# Patient Record
Sex: MALE | Race: BLACK OR AFRICAN AMERICAN | NOT HISPANIC OR LATINO
[De-identification: names, ages, dates, MRNs, and addresses within clinical notes are randomized per-mention and may not be internally consistent; named-entity substitution may affect disease eponyms.]

---

## 2020-02-19 ENCOUNTER — APPOINTMENT (OUTPATIENT)
Dept: ORTHOPEDIC SURGERY | Facility: CLINIC | Age: 70
End: 2020-02-19
Payer: MEDICARE

## 2020-02-19 VITALS
WEIGHT: 190 LBS | DIASTOLIC BLOOD PRESSURE: 134 MMHG | HEIGHT: 70 IN | SYSTOLIC BLOOD PRESSURE: 217 MMHG | HEART RATE: 160 BPM | BODY MASS INDEX: 27.2 KG/M2

## 2020-02-19 DIAGNOSIS — T14.8XXA OTHER INJURY OF UNSPECIFIED BODY REGION, INITIAL ENCOUNTER: ICD-10-CM

## 2020-02-19 DIAGNOSIS — Z82.61 FAMILY HISTORY OF ARTHRITIS: ICD-10-CM

## 2020-02-19 DIAGNOSIS — Z80.9 FAMILY HISTORY OF MALIGNANT NEOPLASM, UNSPECIFIED: ICD-10-CM

## 2020-02-19 DIAGNOSIS — G54.0 BRACHIAL PLEXUS DISORDERS: ICD-10-CM

## 2020-02-19 DIAGNOSIS — Z78.9 OTHER SPECIFIED HEALTH STATUS: ICD-10-CM

## 2020-02-19 DIAGNOSIS — M25.522 PAIN IN LEFT ELBOW: ICD-10-CM

## 2020-02-19 PROBLEM — Z00.00 ENCOUNTER FOR PREVENTIVE HEALTH EXAMINATION: Status: ACTIVE | Noted: 2020-02-19

## 2020-02-19 PROCEDURE — 99204 OFFICE O/P NEW MOD 45 MIN: CPT | Mod: 25

## 2020-02-19 PROCEDURE — 29125 APPL SHORT ARM SPLINT STATIC: CPT | Mod: LT

## 2020-02-19 PROCEDURE — 73080 X-RAY EXAM OF ELBOW: CPT | Mod: LT

## 2020-02-19 RX ORDER — METHYLPREDNISOLONE 4 MG/1
4 TABLET ORAL
Qty: 1 | Refills: 0 | Status: ACTIVE | COMMUNITY
Start: 2020-02-19 | End: 1900-01-01

## 2020-02-19 NOTE — PHYSICAL EXAM
[de-identified] : Issue is noted mild tenderness to the heel and lateral aspect of his left elbow, no soft tissue swelling, no masses or ecchymosis. Examination of the forearm is not specific. 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 2 point  discrimination is preserved [de-identified] : X-rays of the left elbow and upper forearmIn AP lateral and oblique projections are nonspecific

## 2020-02-19 NOTE — REVIEW OF SYSTEMS
[Joint Pain] : joint pain [Arthralgia] : arthralgia [Joint Swelling] : joint swelling [Joint Stiffness] : joint stiffness [Negative] : Endocrine

## 2020-02-19 NOTE — DISCUSSION/SUMMARY
[de-identified] : Patient was seen by myself as well as Dr. Pabon. The exact etiology of neuropathy is not clear at this point and further workup is indicated. The patient will be referred for neurologic evaluation which would include EMG and nerve conduction studies. He will be placed in a cock-up splint and started on a Medrol Dosepak.

## 2020-02-19 NOTE — HISTORY OF PRESENT ILLNESS
[___ wks] : [unfilled] week(s) ago [5] : a maximum pain level of 5/10 [Lifting] : worsened by lifting [Intermit.] : ~He/She~ states the symptoms seem to be intermittent [Rest] : relieved by rest [de-identified] : Pt presents for initial evaluation with pain in his left elbow. Pt evidently hit storm door into left elbow. Pt is right hand dominant. Pt did not take any medication for pain. Pt is wearing a sling for support prn. Pt is an avid . Hx of fx arm left at 11 years old. Pt has no hx of gout.  [de-identified] : leaning on left elbow.

## 2020-09-18 ENCOUNTER — NON-APPOINTMENT (OUTPATIENT)
Age: 70
End: 2020-09-18

## 2020-09-18 ENCOUNTER — APPOINTMENT (OUTPATIENT)
Dept: OPHTHALMOLOGY | Facility: CLINIC | Age: 70
End: 2020-09-18
Payer: MEDICARE

## 2020-09-18 PROCEDURE — 92083 EXTENDED VISUAL FIELD XM: CPT

## 2020-09-18 PROCEDURE — 92004 COMPRE OPH EXAM NEW PT 1/>: CPT

## 2020-09-18 PROCEDURE — ZZZZZ: CPT

## 2020-09-18 PROCEDURE — 92133 CPTRZD OPH DX IMG PST SGM ON: CPT

## 2020-09-23 ENCOUNTER — APPOINTMENT (OUTPATIENT)
Dept: OPHTHALMOLOGY | Facility: CLINIC | Age: 70
End: 2020-09-23

## 2020-09-23 ENCOUNTER — NON-APPOINTMENT (OUTPATIENT)
Age: 70
End: 2020-09-23

## 2020-12-08 ENCOUNTER — APPOINTMENT (OUTPATIENT)
Dept: NEUROLOGY | Facility: CLINIC | Age: 70
End: 2020-12-08